# Patient Record
(demographics unavailable — no encounter records)

---

## 2024-11-08 NOTE — PHYSICAL EXAM
[Alert] : alert [Normal Voice/Communication] : normal voice/communication [Healthy Appearing] : healthy appearing [No Acute Distress] : no acute distress [Sclera] : the sclera and conjunctiva were normal [Hearing Threshold Finger Rub Not [place]] : hearing was normal [Normal Lips/Gums] : the lips and gums were normal [Oropharynx] : the oropharynx was normal [Normal Appearance] : the appearance of the neck was normal [No Neck Mass] : no neck mass was observed [No Respiratory Distress] : no respiratory distress [No Acc Muscle Use] : no accessory muscle use [Respiration, Rhythm And Depth] : normal respiratory rhythm and effort [Auscultation Breath Sounds / Voice Sounds] : lungs were clear to auscultation bilaterally [Heart Rate And Rhythm] : heart rate was normal and rhythm regular [Normal S1, S2] : normal S1 and S2 [Murmurs] : no murmurs [Bowel Sounds] : normal bowel sounds [No Masses] : no abdominal mass palpated [Abdomen Soft] : soft [] : no hepatosplenomegaly [RUQ] : RUQ [LUQ] : LUQ [Epigastric] : epigastric [Oriented To Time, Place, And Person] : oriented to person, place, and time

## 2024-11-08 NOTE — PHYSICAL EXAM
[Alert] : alert [Normal Voice/Communication] : normal voice/communication [Healthy Appearing] : healthy appearing [No Acute Distress] : no acute distress [Sclera] : the sclera and conjunctiva were normal [Hearing Threshold Finger Rub Not [place]] : hearing was normal [Normal Lips/Gums] : the lips and gums were normal [Oropharynx] : the oropharynx was normal [Normal Appearance] : the appearance of the neck was normal [No Neck Mass] : no neck mass was observed [No Respiratory Distress] : no respiratory distress [No Acc Muscle Use] : no accessory muscle use [Respiration, Rhythm And Depth] : normal respiratory rhythm and effort [Auscultation Breath Sounds / Voice Sounds] : lungs were clear to auscultation bilaterally [Heart Rate And Rhythm] : heart rate was normal and rhythm regular [Normal S1, S2] : normal S1 and S2 [Murmurs] : no murmurs [Bowel Sounds] : normal bowel sounds [No Masses] : no abdominal mass palpated [Abdomen Soft] : soft [] : no hepatosplenomegaly [RUQ] : RUQ [LUQ] : LUQ [Epigastric] : epigastric [Oriented To Time, Place, And Person] : oriented to person, place, and time

## 2024-11-11 NOTE — ASSESSMENT
[FreeTextEntry1] :  46 y.o M w/ hx of DM, HTN, DL, IBS, hx of pancreatitis (several episodes), s/p ccy, here for post hospitalization f/u.   #hx of pancreatitis w/ no clear cause (at least 2x in 2024 and at age 13?) ddx includes medication induced (supplements vs atorvastatin) vs less likely autoimmune (FNB w/ IHC neg) vs panc divisum vs. idiopathic #Erosive gastritis and erosive duodenitis #Pancreatic cyst, likely IPMN Repeat labs done 11/7/24 reviewed and were stable  -clear Liquid diet for the next 2 days, then add soft low fat foods -Avoid high fat diet when incorporating solids back into diet -Patient taking multiple supplements, stop all supplements -Avoid alcohol use -Will switch simvastatin to atorvastatin 40mg. Instructed to follow up with Dr. Jones as well  -Necessity of smoking cessation -check IgG4 levels -Will do MRI April 2025 (pt has no insurance coverage until then) -avoid NSAIDs   #CRC Screening #Colon polyps, hyperplastic -Colonoscopy results discussed -Repeat colonoscopy in 5 yrs.  Recommending following up in 3 months, pt will make appt for April due to insurance.

## 2024-11-11 NOTE — HISTORY OF PRESENT ILLNESS
[FreeTextEntry1] :  46 y.o M w/ hx of DM, HTN, DL, IBS, hx of pancreatitis (several episodes), s/p ccy, here for post hospitalization f/u.   Patient states his abdominal pain was resolved until November 1. States he started having mid abdominal pain not associated with any nausea, vomiting, diarrhea, fevers. Patient went to the ED was discharged from the hospital yesterday for pancreatitis. Patient felt well after leaving but ate grilled chicken and began having worsening pain. States the pain has minimized but still having discomfort that radiates to his back.  Patient states he has lost 10 lbs since then.   Patient states he started taking milk thistle about 1 month ago, which is likely the cause of the pancreatitis.   Otherwise, patient denies any other GI complaints and has no nausea or vomiting, no dysphagia or odynophagia, no heartburn, no diarrhea, no constipation or changes in BMs including hematochezia or melena, no coffee ground emesis or hematemesis.  ROS otherwise negative.   ----------------------------------------------- 11/7/24 Labs Reviewed Hgb: 18.8 Hct: 57.2 Lipase: 90 AST: 28 ALT: 27  Labs Reviewed 9/6/24 Hgb: 17.5 Hct: 52.5 WBC 12.57 LFTs WNL Lipid panel nl except HDL slightly low at 38 PT and INR nl ----------------------------------------------- 9/10/24: EGD: Erosive gastritis (biopsy), pancreatic rest, erosive duodenitis (biopsy). Pathology was negative for HP, IM, Dysplasia, and Celiac Disease  EUS: Sausage shaped appearance of the pancreas at the level of the pancreatic body s/p FNB.  EUS: 5 mm anechoic round cystic lesion in the pancreatic body that was connected to the PD suggestive of branch duct IPMN.  EUS: Otherwise, normal pancreatic parenchyma in the head, genu and tail of the pancreas with a normal PD throughout the pancreas. EUS: Prior cholecystectomy. Otherwise unremarkable exam.  9/10/24: Colonoscopy revealed 2 polyps from 5-6 mm which were hyperplastic and external hemorrhoids   ----------------------------------------------- path results from FNB of pancreas:  - NO MALIGNANT CELLS IDENTIFIED. - Benign pancreatic tissue with focal fibrosis and mild lymphocytic infiltration (see comment).  Comment: Immunohistochemical stains are performed on the cell block sections. CD45 stain highlights scattered lymphocytes. CK7 stain demonstrates the pancreatic ducts. Stains for chromogranin and synatophysin are negative.  Stains for , IgG and IgG4 are also negative (see Addendum). No evidences of neoplasm, autoimmune or IgG4 pancreatitis are seen.   , IGG AND IGG4 SHOW NEARLY NO PLASMA CELLS SEEN ----------------------------------------------- PMHx: Diabetes, DL, HTN, pancreatitis (several episodes), IBS PSHx: knee surgery, cholecystectomy, carpal tunnel surgery Fam hx: Diabetes (maternal) Social hx: current smoker Meds: see list Allergies: nkda  ----------------------------------------------- 11/7/24 CT Abdomen Pelvis w/IV contrast Suggestion of mild interstitial edematous pancreatitis. Consider correlation with lipase levels for confirmation.  9/4/24 CT Abdomen Pelvis w/ IV contrast No evidence of acute abdominal pelvic pathology.  7/19/24 RUQ US Poor visualization of the pancreas. Status post cholecystectomy. Mild right hydronephrosis.

## 2025-04-22 NOTE — HISTORY OF PRESENT ILLNESS
[FreeTextEntry1] : 47-year-old with history of elevated PSA.  In 2023 when his PSA was 8.2 he had benign biopsy.  Current PSA 4.8 improved.   He does have some decreased urinary flow but not bothersome enough to want treatment.  History of erectile dysfunction was prescribed Trimix but never got it.  On testosterone replacement therapy by Dr. Lindsay.  He is a diabetic with history of scrotal abscesses.  Hemoglobin 20, WBC 15.  He has been referred to heme-onc by his primary.

## 2025-04-22 NOTE — ASSESSMENT
[FreeTextEntry1] : Elevated PSA stable.  Recheck in 6 months.  Lower urinary tract symptoms.  Will continue to monitor.  Erectile dysfunction.  Recommend consultation with our sexual health specialist Dr. De.  Elevated hemoglobin may be related to TRT.  He is seeing heme-onc to rule out other causes.   [Urinary Symptom or Sign (788.99\R39.89)] : implantation

## 2025-05-01 NOTE — REASON FOR VISIT
[Home] : at home, [unfilled] , at the time of the visit. [Medical Office: ([facility])___] : at the medical office located in  [Telephone (audio)] : This telephonic visit was provided via audio only technology. [Patient preference] : patient preference. [Verbal consent obtained from patient] : the patient, [unfilled] [Post Hospitalization] : a post hospitalization visit [FreeTextEntry4] : Thelma YANG

## 2025-05-01 NOTE — END OF VISIT
[FreeTextEntry3] : The PA was present during this visit for assistance with history acquisition and documentation. The plan of care was independently formulated by myself, the attending physician.  [Time Spent: ___ minutes] : I have spent [unfilled] minutes of time on the encounter which excludes teaching and separately reported services.

## 2025-05-01 NOTE — REASON FOR VISIT
[Home] : at home, [unfilled] , at the time of the visit. [Medical Office: ([facility])___] : at the medical office located in  [Telephone (audio)] : This telephonic visit was provided via audio only technology. [Patient preference] : patient preference. [Verbal consent obtained from patient] : the patient, [unfilled] [Post Hospitalization] : a post hospitalization visit [FreeTextEntry4] : Thelma YANG

## 2025-05-01 NOTE — HISTORY OF PRESENT ILLNESS
[FreeTextEntry1] : 47 y.o M w/ hx of DM, HTN, DL, IBS, hx of pancreatitis (several episodes), s/p ccy, here for F/U post hospitalization for recurrence of pancreatitis 4/12/25.  Patient was recently admitted to the hospital for epigastric abdominal pain that is similar to his prior episodes.  The pain was severe but resolved after 1 day of hospitalization with supportive care. During hospitalization, he underwent a CT on 4/12/2025 revealing peripancreatic edema suggestive of pancreatitis.  An MRI of the abdomen was performed on 4/14/2025 revealing pancreatic cysts.  He continues to smoke and takes testosterone supplements.  He is doing well now after discharge with no recurrent symptoms.  Patient denied any further abdominal pain since discharge.  He has been eating well without vomiting, fevers, dysphagia, melena, constipation, diarrhea, weight loss, or hematochezia.  He has been taking protonix q day with good control of heartburn. ROS otherwise negative.    Prior visit: He states his abdominal pain was resolved until November 1. States he started having mid abdominal pain not associated with any nausea, vomiting, diarrhea, fevers. Patient went to the ED was discharged from the hospital yesterday for pancreatitis. Patient felt well after leaving but ate grilled chicken and began having worsening pain. States the pain has minimized but still having discomfort that radiates to his back.  Patient states he has lost 10 lbs since then.   Patient states he started taking milk thistle about 1 month ago, which is likely the cause of the pancreatitis.   Otherwise, patient denies any other GI complaints and has no nausea or vomiting, no dysphagia or odynophagia, no heartburn, no diarrhea, no constipation or changes in BMs including hematochezia or melena, no coffee ground emesis or hematemesis.  ROS otherwise negative.   -----------------------------------------------  Labs done 4/14/25 reviewed: CBC Hgb 18.4 Hct 56.4 CMP normal LFTs, GFR normal  11/7/24 Labs Reviewed Hgb: 18.8 Hct: 57.2 Lipase: 90 AST: 28 ALT: 27  Labs Reviewed 9/6/24 Hgb: 17.5 Hct: 52.5 WBC 12.57 LFTs WNL Lipid panel nl except HDL slightly low at 38 PT and INR nl ----------------------------------------------- 9/10/24: EGD: Erosive gastritis (biopsy), pancreatic rest, erosive duodenitis (biopsy). Pathology was negative for HP, IM, Dysplasia, and Celiac Disease  EUS: Sausage shaped appearance of the pancreas at the level of the pancreatic body s/p FNB.  EUS: 5 mm anechoic round cystic lesion in the pancreatic body that was connected to the PD suggestive of branch duct IPMN.  EUS: Otherwise, normal pancreatic parenchyma in the head, genu and tail of the pancreas with a normal PD throughout the pancreas. EUS: Prior cholecystectomy. Otherwise unremarkable exam.  9/10/24: Colonoscopy revealed 2 polyps from 5-6 mm which were hyperplastic and external hemorrhoids   ----------------------------------------------- path results from FNB of pancreas:  - NO MALIGNANT CELLS IDENTIFIED. - Benign pancreatic tissue with focal fibrosis and mild lymphocytic infiltration (see comment).  Comment: Immunohistochemical stains are performed on the cell block sections. CD45 stain highlights scattered lymphocytes. CK7 stain demonstrates the pancreatic ducts. Stains for chromogranin and synatophysin are negative.  Stains for , IgG and IgG4 are also negative (see Addendum). No evidences of neoplasm, autoimmune or IgG4 pancreatitis are seen.   , IGG AND IGG4 SHOW NEARLY NO PLASMA CELLS SEEN ----------------------------------------------- PMHx: Diabetes, DL, HTN, pancreatitis (several episodes), IBS PSHx: knee surgery, cholecystectomy, carpal tunnel surgery Fam hx: Diabetes (maternal) Social hx: current smoker Meds: see list Allergies: nkda  ----------------------------------------------- MRI of Abdomen 4/14/25: 8 mm and 6 mm pancreatic tail cysts going to the PD compatible with side branch IPMNs. No duct dilatation. Recommend follow-up MRCP with and without contrast in 1 year.  CT scan of Abd/Pelvis with IV contrast 4/12/25: Peripancreatic edema suggestive of acute pancreatitis. Recommend clinical correlation. No peripancreatic fluid collection. 6 mm lingular nodule new from chest CT from one year prior. Recommend chest CT follow-up within 3 months. Dense coronary artery calcifications as well as atherosclerotic calcifications of the aorta and its branches. Recommend further evaluation, particularly given patient age.     11/7/24 CT Abdomen Pelvis w/IV contrast Suggestion of mild interstitial edematous pancreatitis. Consider correlation with lipase levels for confirmation.  9/4/24 CT Abdomen Pelvis w/ IV contrast No evidence of acute abdominal pelvic pathology.  7/19/24 RUQ US Poor visualization of the pancreas. Status post cholecystectomy. Mild right hydronephrosis.

## 2025-05-01 NOTE — ASSESSMENT
[FreeTextEntry1] : 47 y.o M w/ hx of DM, HTN, DL, IBS, hx of pancreatitis (several episodes), s/p ccy, here for F/U post hospitalization for recurrence of pancreatitis 4/12/25.  #hx of pancreatitis w/ no clear cause (at least 2x in 2024 and at age 13?) ddx includes medication induced (supplements vs atorvastatin) vs less likely autoimmune (FNB w/ IHC neg) vs panc divisum vs. idiopathic vs ?? microthrombosis from Polycythemia ??  #Erosive gastritis and erosive duodenitis #Pancreatic cyst, likely IPMN Repeat labs done 11/7/24 reviewed and were stable 4/2025 MRI/MRCP w/ 8 mm and 6 mm pancreatic tail cysts going to the PD compatible with side branch IPMN  -continue low fat diet -Patient taking multiple supplements, stop all supplements -Avoid alcohol use -Will switch simvastatin to atorvastatin 40mg. Instructed to follow up with Dr. Romo as well  -Necessity of smoking cessation discussed  -IgG4 levels normal -MRI done 4/14/25 discussed with patient; pancreatic cysts likely IPMNs -Will do an EUS in 1 year -avoid NSAIDs -Continue protonix 40 mg OD (has refills) -Refer to Genetic Counselor for genetic testing for pancreatitis (CFTR, SPINK1, PRSS1, etc) -Repeat labs including IgG4, CBC, CMP, lipids, and PT/INR  #Polycythemia -Will check for JAK2 mutation analysis -Refer to Hematology  #CRC Screening #Colon polyps, hyperplastic -Colonoscopy results discussed -Repeat colonoscopy in 9/2029 (5 y interval).  Recommending following up in 3 months

## 2025-05-01 NOTE — REASON FOR VISIT
[Home] : at home, [unfilled] , at the time of the visit. [Medical Office: ([facility])___] : at the medical office located in  [Telephone (audio)] : This telephonic visit was provided via audio only technology. [Patient preference] : patient preference. [Verbal consent obtained from patient] : the patient, [unfilled] [Post Hospitalization] : a post hospitalization visit [FreeTextEntry4] : Thelma YANG

## 2025-07-23 NOTE — ASSESSMENT
[FreeTextEntry1] : COPD / ACO Smoker continues to smoke. HO NILSON On PAP therapy.  Fair compliance  Lung nodules stable on Last CT Lost for follow up from 2023 
For information on Fall & Injury Prevention, visit: [email or web address] OR  [email or web address] OR  https://www.cdc.gov/steadi/patient.html

## 2025-07-23 NOTE — HISTORY OF PRESENT ILLNESS
[Stable] : stable [Difficulty Breathing During Exertion] : stable dyspnea on exertion [Feelings Of Weakness On Exertion] : stable exercise intolerance [Wheezing] : worsened wheezing [Adherent] : the patient is not adherent with ~his/her~ medication regimen [PFTs] : pulmonary function tests [CPAP] : CPAP [Good Compliance] : good compliance with treatment [Good Tolerance] : good tolerance of treatment [Good Symptom Control] : good symptom control [Initial Evaluation] : an initial evaluation of [None] : The patient is currently asymptomatic